# Patient Record
Sex: FEMALE | Race: WHITE | NOT HISPANIC OR LATINO | URBAN - NONMETROPOLITAN AREA
[De-identification: names, ages, dates, MRNs, and addresses within clinical notes are randomized per-mention and may not be internally consistent; named-entity substitution may affect disease eponyms.]

---

## 2023-06-15 ENCOUNTER — OFFICE (OUTPATIENT)
Dept: URBAN - NONMETROPOLITAN AREA CLINIC 1 | Facility: CLINIC | Age: 68
End: 2023-06-15

## 2023-06-15 VITALS
HEIGHT: 66 IN | HEART RATE: 68 BPM | SYSTOLIC BLOOD PRESSURE: 127 MMHG | WEIGHT: 190 LBS | DIASTOLIC BLOOD PRESSURE: 67 MMHG

## 2023-06-15 DIAGNOSIS — I10 ESSENTIAL (PRIMARY) HYPERTENSION: ICD-10-CM

## 2023-06-15 DIAGNOSIS — Z86.010 PERSONAL HISTORY OF COLONIC POLYPS: ICD-10-CM

## 2023-06-15 DIAGNOSIS — E78.5 HYPERLIPIDEMIA, UNSPECIFIED: ICD-10-CM

## 2023-06-15 DIAGNOSIS — E11.9 TYPE 2 DIABETES MELLITUS WITHOUT COMPLICATIONS: ICD-10-CM

## 2023-06-15 PROCEDURE — 99204 OFFICE O/P NEW MOD 45 MIN: CPT | Performed by: NURSE PRACTITIONER

## 2023-06-15 NOTE — SERVICENOTES
Risks for colonoscopy were discussed with her and she agrees to proceed.
The bowel prep was prescribed and instructions were provided.

## 2023-06-15 NOTE — SERVICEHPINOTES
In today to schedule a surveillance colonoscopy, for previous colon polyps. She has normal bowel movements. No abdominal pain or blood with her stools. She has a good appetite and has not had unexpected weight loss. Her chronic illnesses include diabetes, hypertension, hyperlipidemia.
br
brColonoscopy 7/11/18 by Dr. Miller-
[place] Findings: 3 millimeter broad-based ascending colon polyp removed with cold snare technique and retrieved. 7 millimeter sigmoid colon broad-based polyp removed with cold snare technique and retrieved. Small to medium size internal hemorrhoids. Otherwise normal colon. Normal terminal ileum. 
br Final Pathologic Diagnosis:br   1.   ASCENDING COLON, POLYPECTOMY -br     Tubular adenoma.br   2.   sigmoid colon, polypectomy -br     Tubular adenoma.

## 2023-09-11 ENCOUNTER — ON CAMPUS - OUTPATIENT (OUTPATIENT)
Dept: URBAN - NONMETROPOLITAN AREA HOSPITAL 34 | Facility: HOSPITAL | Age: 68
End: 2023-09-11
Payer: COMMERCIAL

## 2023-09-11 DIAGNOSIS — K64.0 FIRST DEGREE HEMORRHOIDS: ICD-10-CM

## 2023-09-11 DIAGNOSIS — D12.3 BENIGN NEOPLASM OF TRANSVERSE COLON: ICD-10-CM

## 2023-09-11 DIAGNOSIS — Z86.010 PERSONAL HISTORY OF COLONIC POLYPS: ICD-10-CM

## 2023-09-11 PROCEDURE — 45385 COLONOSCOPY W/LESION REMOVAL: CPT | Mod: PT | Performed by: INTERNAL MEDICINE
